# Patient Record
Sex: FEMALE | URBAN - NONMETROPOLITAN AREA
[De-identification: names, ages, dates, MRNs, and addresses within clinical notes are randomized per-mention and may not be internally consistent; named-entity substitution may affect disease eponyms.]

---

## 2020-05-20 ENCOUNTER — NURSE TRIAGE (OUTPATIENT)
Dept: CALL CENTER | Facility: HOSPITAL | Age: 20
End: 2020-05-20

## 2020-05-20 NOTE — TELEPHONE ENCOUNTER
"    Reason for Disposition  • [1] SEVERE pain (e.g., excruciating) AND [2] not improved 2 hours after pain medicine    Additional Information  • Negative: [1] Difficulty breathing AND [2] exposure to fire, smoke, or fumes  • Negative: Shock suspected (e.g., cold/pale/clammy skin, too weak to stand, low BP, rapid pulse)  • Negative: Difficult to awaken or acting confused (e.g., disoriented, slurred speech)  • Negative: [1] Burn area larger than 10 palms of hand (> 10% BSA) AND [2] blisters  • Negative: Sounds like a life-threatening emergency to the triager  • Negative: Smoke inhalation is main concern  • Negative: Sunburn  • Negative: Electrical burn  • Negative: Chemical burn  • Negative: Burn area larger than 4 palms of hand (> 4% BSA)  • Negative: Burn completely circles an arm or leg  • Negative: Caused by explosion or gunpowder  • Negative: [1] Caused by very hot substance AND [2] center of burn is white (or charred)  • Negative: [1] Blister (intact or ruptured) AND [2] larger than 2 inches (5 cm)  • Negative: [1] Blister (intact or ruptured) on the hand AND [2] larger  than 1 inch (2.5 cm)  • Negative: [1] Blister (intact or ruptured) AND [2] on the face, neck, or genitals  • Negative: [1] Headache or nausea AND [2] exposure to fire, smoke, or fumes  • Negative: Sounds like a serious injury to the triager    Answer Assessment - Initial Assessment Questions  1. ONSET: \"When did it happen?\" If happened < 10 minutes ago, ask: \"Did you apply cold water?\" If not, give First Aid Advice immediately.       Last night   2. LOCATION: \"Where is the burn located?\"       Palm of hand   3. BURN SIZE: \"How large is the burn?\"  The palm is roughly 1% of the total body surface area (BSA).      1%  4. SEVERITY OF THE BURN: \"Are there any blisters?\"       Yes; multiple blisters   5. MECHANISM: \"Tell me how it happened.\"      Cooking chicken   6. PAIN: \"Are you having any pain?\" \"How bad is the pain?\" (Scale 1-10; or mild, " "moderate, severe)    - MILD (1-3): doesn't interfere with normal activities     - MODERATE (4-7): interferes with normal activities or awakens from sleep     - SEVERE (8-10): excruciating pain, unable to do any normal activities       Severe   7. INHALATION INJURY: \"Were you exposed to any smoke or fumes?\" If yes: \"Do you have any cough or difficulty breathing?\"      No   8. OTHER SYMPTOMS: \"Do you have any other symptoms?\" (e.g., headache, nausea)      Hard to squeeze hand together   9. PREGNANCY: \"Is there any chance you are pregnant?\" \"When was your last menstrual period?\"      No    Protocols used: BURNS - THERMAL-ADULT-AH      "